# Patient Record
Sex: FEMALE | Race: OTHER | NOT HISPANIC OR LATINO
[De-identification: names, ages, dates, MRNs, and addresses within clinical notes are randomized per-mention and may not be internally consistent; named-entity substitution may affect disease eponyms.]

---

## 2024-10-08 PROBLEM — Z00.00 ENCOUNTER FOR PREVENTIVE HEALTH EXAMINATION: Status: ACTIVE | Noted: 2024-10-08

## 2024-10-09 ENCOUNTER — LABORATORY RESULT (OUTPATIENT)
Age: 44
End: 2024-10-09

## 2024-10-09 ENCOUNTER — APPOINTMENT (OUTPATIENT)
Dept: UROLOGY | Facility: CLINIC | Age: 44
End: 2024-10-09

## 2024-10-09 ENCOUNTER — OUTPATIENT (OUTPATIENT)
Dept: OUTPATIENT SERVICES | Facility: HOSPITAL | Age: 44
LOS: 1 days | End: 2024-10-09
Payer: COMMERCIAL

## 2024-10-09 VITALS
TEMPERATURE: 100.3 F | OXYGEN SATURATION: 97 % | SYSTOLIC BLOOD PRESSURE: 120 MMHG | HEART RATE: 89 BPM | DIASTOLIC BLOOD PRESSURE: 77 MMHG

## 2024-10-09 DIAGNOSIS — Z84.1 FAMILY HISTORY OF DISORDERS OF KIDNEY AND URETER: ICD-10-CM

## 2024-10-09 DIAGNOSIS — Z83.3 FAMILY HISTORY OF DIABETES MELLITUS: ICD-10-CM

## 2024-10-09 DIAGNOSIS — Z87.891 PERSONAL HISTORY OF NICOTINE DEPENDENCE: ICD-10-CM

## 2024-10-09 DIAGNOSIS — N20.0 CALCULUS OF KIDNEY: ICD-10-CM

## 2024-10-09 PROCEDURE — 99203 OFFICE O/P NEW LOW 30 MIN: CPT

## 2024-10-09 PROCEDURE — 74018 RADEX ABDOMEN 1 VIEW: CPT | Mod: 26

## 2024-10-09 PROCEDURE — 74018 RADEX ABDOMEN 1 VIEW: CPT

## 2024-10-09 RX ORDER — MULTIVITAMIN
TABLET ORAL
Refills: 0 | Status: ACTIVE | COMMUNITY

## 2024-10-23 ENCOUNTER — APPOINTMENT (OUTPATIENT)
Dept: UROLOGY | Facility: CLINIC | Age: 44
End: 2024-10-23
Payer: COMMERCIAL

## 2024-10-23 PROCEDURE — 99214 OFFICE O/P EST MOD 30 MIN: CPT

## 2024-11-04 NOTE — ASU PATIENT PROFILE, ADULT - NS PREOP UNDERSTANDS INFO
NPO after midnight except clears such as water or apple juice before 0600 No dairy, gum or candy. We comfortable clothing. No lotions, perfume, contacts or jewelry. Escort is required to bring you home, bring ID and insurance card./yes

## 2024-11-05 ENCOUNTER — TRANSCRIPTION ENCOUNTER (OUTPATIENT)
Age: 44
End: 2024-11-05

## 2024-11-05 ENCOUNTER — OUTPATIENT (OUTPATIENT)
Dept: OUTPATIENT SERVICES | Facility: HOSPITAL | Age: 44
LOS: 1 days | Discharge: HOME | End: 2024-11-05
Payer: COMMERCIAL

## 2024-11-05 ENCOUNTER — APPOINTMENT (OUTPATIENT)
Dept: UROLOGY | Facility: AMBULATORY SURGERY CENTER | Age: 44
End: 2024-11-05

## 2024-11-05 VITALS
HEART RATE: 78 BPM | SYSTOLIC BLOOD PRESSURE: 110 MMHG | WEIGHT: 119.05 LBS | RESPIRATION RATE: 16 BRPM | DIASTOLIC BLOOD PRESSURE: 70 MMHG | HEIGHT: 67 IN | OXYGEN SATURATION: 100 % | TEMPERATURE: 98 F

## 2024-11-05 VITALS
HEART RATE: 70 BPM | OXYGEN SATURATION: 98 % | DIASTOLIC BLOOD PRESSURE: 65 MMHG | SYSTOLIC BLOOD PRESSURE: 111 MMHG | RESPIRATION RATE: 15 BRPM

## 2024-11-05 DIAGNOSIS — Z98.891 HISTORY OF UTERINE SCAR FROM PREVIOUS SURGERY: Chronic | ICD-10-CM

## 2024-11-05 PROCEDURE — 50590 FRAGMENTING OF KIDNEY STONE: CPT | Mod: 52,LT

## 2024-11-05 RX ORDER — ONDANSETRON HYDROCHLORIDE 2 MG/ML
4 INJECTION, SOLUTION INTRAMUSCULAR; INTRAVENOUS ONCE
Refills: 0 | Status: DISCONTINUED | OUTPATIENT
Start: 2024-11-05 | End: 2024-11-05

## 2024-11-05 RX ORDER — OXYCODONE HYDROCHLORIDE 30 MG/1
0 TABLET ORAL
Refills: 0 | DISCHARGE

## 2024-11-05 RX ORDER — APREPITANT 40 MG/1
40 CAPSULE ORAL ONCE
Refills: 0 | Status: COMPLETED | OUTPATIENT
Start: 2024-11-05 | End: 2024-11-05

## 2024-11-05 RX ORDER — ACETAMINOPHEN 500 MG
1000 TABLET ORAL ONCE
Refills: 0 | Status: COMPLETED | OUTPATIENT
Start: 2024-11-05 | End: 2024-11-05

## 2024-11-05 RX ADMIN — Medication 1000 MILLIGRAM(S): at 09:20

## 2024-11-05 RX ADMIN — APREPITANT 40 MILLIGRAM(S): 40 CAPSULE ORAL at 09:20

## 2024-11-05 NOTE — BRIEF OPERATIVE NOTE - OPERATION/FINDINGS
Left ESWL  Attempted left ESWL and stone was noted to be in the dependent bladder therefore we did not proceed with ESWL

## 2024-11-05 NOTE — ASU PREOP CHECKLIST - NS PREOP CHK CHLOROHEX WASH
"CNP Notification    Notified Person: CNP    Notified Person Name: Cady Escobar    N/A

## 2024-11-05 NOTE — BRIEF OPERATIVE NOTE - NSICDXBRIEFPROCEDURE_GEN_ALL_CORE_FT
PROCEDURES:  Extracorporeal shockwave lithotripsy (ESWL) of left ureter 05-Nov-2024 06:56:31  Mirlande Durant   PROCEDURES:  Extracorporeal shockwave lithotripsy (ESWL) of left ureter 05-Nov-2024 06:56:31 Attempted left ESWL Mirlande Durant

## 2024-11-05 NOTE — ASU DISCHARGE PLAN (ADULT/PEDIATRIC) - ASU DC SPECIAL INSTRUCTIONSFT
EXTRACORPOREAL SHOCKWAVE LITHOTRIPSY    GENERAL: It is common to have blood in your urine after your procedure.  It may be pink or even red; and it is important to increase fluid intake to 2-3L of water per day to keep the urine as clear as possible. Please inform your doctor if you have a significant amount of clot in the urine or if you are unable to void at all.  The urine may clear and then become bloody again especially as you are more physically active. It is not uncommon to have some burning when you urinate, this will gradually improve. If a catheter in place, it is not uncommon to have occasional leakage or urine or blood around the catheter. Please call your urologist if this is excessive and/or the urine is not draining through the catheter into the bag.    CATHETER: Some patients are sent home with a Smallwood catheter, which continuously drains the urine from the bladder. If you still have a catheter, the nurses will review instructions and care before you go home. For men, you may have a prescription for lidocaine jelly to apply to the tip of your penis, as needed, for catheter related discomfort.      PAIN: You may take Tylenol (acetaminophen) 650-975mg and/or Motrin (ibuprofen) 400-600mg, both available over the counter, for pain every 6 hours as needed. Do not exceed 4000mg of Tylenol (acetaminophen) daily. You may alternate these medications such that you take one or the other every 3 hours for around the clock pain coverage.      STOOL SOFTENERS: Do not allow yourself to become constipated as straining may cause bleeding. Take stool softeners or a laxative (ex. Miralax, Colace, Senokot, ExLax, etc), available over the counter, if needed.    ANTICOAGULATION: If you are taking any blood thinning medications, please discuss with your urologist prior to restarting these medications unless otherwise specified.    BATHING: You may shower or bathe. If going home with smallwood, shower only until catheter is removed.    DIET: You may resume your regular diet and regular medication regimen.    ACTIVITY: No heavy lifting or strenuous exercise until you are evaluated at your post-operative appointment. Otherwise, you may return to your usual level of physical activity.    FOLLOW-UP: If you did not already schedule your post-operative appointment, please call your urologist to schedule and follow-up appointment.    CALL YOUR UROLOGIST IF: You have any bleeding that does not stop, inability to void >8 hours, fever over 100.4 F, chills, persistent nausea/vomiting, changes in your incision concerning for infection, or if your pain is not controlled on your discharge pain medications. Attempted EXTRACORPOREAL SHOCKWAVE LITHOTRIPSY    GENERAL: It is common to have blood in your urine after your procedure.  It may be pink or even red; and it is important to increase fluid intake to 2-3L of water per day to keep the urine as clear as possible. Please inform your doctor if you have a significant amount of clot in the urine or if you are unable to void at all.  The urine may clear and then become bloody again especially as you are more physically active. It is not uncommon to have some burning when you urinate, this will gradually improve. If a catheter in place, it is not uncommon to have occasional leakage or urine or blood around the catheter. Please call your urologist if this is excessive and/or the urine is not draining through the catheter into the bag.    CATHETER: Some patients are sent home with a Smallwood catheter, which continuously drains the urine from the bladder. If you still have a catheter, the nurses will review instructions and care before you go home. For men, you may have a prescription for lidocaine jelly to apply to the tip of your penis, as needed, for catheter related discomfort.      PAIN: You may take Tylenol (acetaminophen) 650-975mg and/or Motrin (ibuprofen) 400-600mg, both available over the counter, for pain every 6 hours as needed. Do not exceed 4000mg of Tylenol (acetaminophen) daily. You may alternate these medications such that you take one or the other every 3 hours for around the clock pain coverage.      STOOL SOFTENERS: Do not allow yourself to become constipated as straining may cause bleeding. Take stool softeners or a laxative (ex. Miralax, Colace, Senokot, ExLax, etc), available over the counter, if needed.    ANTICOAGULATION: If you are taking any blood thinning medications, please discuss with your urologist prior to restarting these medications unless otherwise specified.    BATHING: You may shower or bathe. If going home with smallwood, shower only until catheter is removed.    DIET: You may resume your regular diet and regular medication regimen.    ACTIVITY: No heavy lifting or strenuous exercise until you are evaluated at your post-operative appointment. Otherwise, you may return to your usual level of physical activity.    FOLLOW-UP: If you did not already schedule your post-operative appointment, please call your urologist to schedule and follow-up appointment.    CALL YOUR UROLOGIST IF: You have any bleeding that does not stop, inability to void >8 hours, fever over 100.4 F, chills, persistent nausea/vomiting, changes in your incision concerning for infection, or if your pain is not controlled on your discharge pain medications.

## 2024-11-05 NOTE — ASU DISCHARGE PLAN (ADULT/PEDIATRIC) - FINANCIAL ASSISTANCE
[facility] provides services at a reduced cost to those who are determined to be eligible through [facility]’s financial assistance program. Information regarding [facility]’s financial assistance program can be found by going to [email or web address] or by calling [phone].

## 2024-11-05 NOTE — PRE-ANESTHESIA EVALUATION ADULT - NSANTHPEFT_GEN_ALL_CORE
Gen: A&Ox4 in NAD with pleasant demeanor  CV: RRR S1/S2 intact  Resp: B/L breath sounds intact, breathing comfortably

## 2024-11-05 NOTE — ASU DISCHARGE PLAN (ADULT/PEDIATRIC) - NS MD DC FALL RISK RISK
For information on Fall & Injury Prevention, visit: [email or web address] OR  [email or web address] OR  https://www.cdc.gov/steadi/patient.html

## 2024-11-06 ENCOUNTER — NON-APPOINTMENT (OUTPATIENT)
Age: 44
End: 2024-11-06

## 2024-12-05 ENCOUNTER — APPOINTMENT (OUTPATIENT)
Dept: UROLOGY | Facility: CLINIC | Age: 44
End: 2024-12-05

## 2024-12-20 ENCOUNTER — APPOINTMENT (OUTPATIENT)
Dept: UROLOGY | Facility: CLINIC | Age: 44
End: 2024-12-20

## 2025-01-03 ENCOUNTER — NON-APPOINTMENT (OUTPATIENT)
Age: 45
End: 2025-01-03

## 2025-02-12 ENCOUNTER — NON-APPOINTMENT (OUTPATIENT)
Age: 45
End: 2025-02-12

## 2025-02-12 ENCOUNTER — APPOINTMENT (OUTPATIENT)
Dept: UROLOGY | Facility: CLINIC | Age: 45
End: 2025-02-12
Payer: COMMERCIAL

## 2025-02-12 PROCEDURE — G2211 COMPLEX E/M VISIT ADD ON: CPT | Mod: NC

## 2025-02-12 PROCEDURE — 76775 US EXAM ABDO BACK WALL LIM: CPT

## 2025-02-12 PROCEDURE — 99213 OFFICE O/P EST LOW 20 MIN: CPT

## 2025-02-25 NOTE — ASU PATIENT PROFILE, ADULT - NS TRANSFER PATIENT BELONGINGS
"Subjective   Patient ID: Guero Cope \"Colby" is a 81 y.o. male who presents for Follow-up.  HPI  Since our last appointment he was admitted at [facility] for hypoglycemia with blood sugars in the 30s and in A-fib with RVR.  He improved with improvement of electrolytes and IV fluids and was medically stable for discharge.    He met with neurology after last appointment.    He has been following with wound care for leg wounds.    Had rectal balloon yesterday for prostate cancer treatment.     Feels jittery today-blood sugar is 177.  Has had bilateral foot tingling intermittently-he is unsure of trigger. It has been present for 6 months. It is only on bottom of feet bilaterally. No back pain a/w tingling. Not better with insulin. No new shoes.     Review of Systems   Constitutional:  Negative for chills, diaphoresis and fever.   Respiratory:  Negative for shortness of breath.    Cardiovascular:  Negative for chest pain.   Gastrointestinal:  Negative for diarrhea, nausea and vomiting.        NO melena, hematochezia, hematemesis, coffee ground emesis.    Neurological:  Negative for dizziness, syncope and light-headedness.       /60   Pulse 84   Temp 36 °C (96.8 °F)   Resp 14   Ht 1.778 m (5' 10\")   SpO2 98%   BMI 22.96 kg/m²     Objective   Physical Exam  Vitals reviewed.   Constitutional:       General: He is not in acute distress.     Appearance: Normal appearance.   HENT:      Head: Normocephalic.   Cardiovascular:      Rate and Rhythm: Normal rate and regular rhythm.   Pulmonary:      Effort: Pulmonary effort is normal. No respiratory distress.      Breath sounds: Normal breath sounds.   Abdominal:      General: There is no distension.   Musculoskeletal:         General: No deformity.   Skin:     Coloration: Skin is not jaundiced.   Neurological:      Mental Status: He is alert.         Assessment/Plan   Problem List Items Addressed This Visit       Anemia    Relevant Orders    CBC    Prostate " cancer (Multi)    Routine health maintenance - Primary    Hepatic steatosis    Relevant Orders    Referral to Hepatology    Primary hypertension    Coronary artery disease involving native coronary artery of native heart without angina pectoris    Stage 3a chronic kidney disease (Multi)    Myasthenia gravis    Type 2 diabetes mellitus with other diabetic neurological complication    Relevant Orders    POCT glycosylated hemoglobin (Hb A1C) manually resulted (Completed)    POCT Fingerstick Glucose manually resulted (Completed)    Myelopathy (Multi)         Jittery  -Check labs  -Close f/u neuro  - today    Fatigue  Thrombocytopenia-resolved  Anemia  - Labs stable, f/u per neuro  -Repeat CBC ordered to monitor anemia     Skin tears  -F/u wound care, better as of recent     Myasthenia gravis  -Multiple admissions at [facility]  -Interval history per Dr. Caicedo with neurology: He was admitted at  from 8/27/23 to 9/9/23 for MG flare, presented with worsening of speech(losing his voice). He tried IVIG (2 doses) without benefit, had worsening of secretions and hypoxia needing intubation(x5 days). Had 5 cycles of PLEX (8/31 to 9/11/23) which helped, started on mycophenolate (500 mg bid).   -S/p removal of right sided TDC  -  Per neuro weaning prednisone on CellCept 1 g twice daily.  Off vygart.  -Continue f/u neurology  -Recommend he discuss tingling in feet with neurology or if back pain reach out to nsgy. ER precautions     Primary hypertension  -On losartan 25 mg a day, amlodipine 10 mg a day, follow-up with cardiology.  -Controlled today     Coronary artery disease  PAD  Hyperlipidemia  HX atrial fibrillation  -Continue management per cardiology including rosuvastatin 20 mg a day, aspirin 81 mg a day.   -Management per Dr. Roque     Kidney stones  -On allopurinol, may need dose adjustment in light of kidney function, defer to urology and will see next week.   -Normal uric acid 3/24  -F/u urology     Renal cyst  -Seen on  imaging in hospitalization, advise f/u with urology     Prostate cancer  - Continue follow-up with Dr. Fay.  Flomax, Myrbetriq, and further management per urology.  -More tx per urology.      Stage IIIa chronic kidney disease  - CKD noted in EMR  -See above elevated creatinine  -U/s kidneys reviewed previously  -Follow-up per nephrology. Advised to make new appt.      Hyperglycemia  -F/u endocrinology, Dr. Greene.  - On insulin glargine, Humalog with sliding scale, metformin  -A1c 5.7->5.8  -BS well controlled, has dexcom.   -Urine albumin normal 8/24.   -F/u and management per endocrinology  -He gave too much insulin on mistake which prompted ER evaluation.      Insomnia  -Sleep is better as of recent.      Cuadra's esophagus  Hiatal hernia  -on dexilant  -Hiatal hernia seen on CT chest 7/23  -Reflux controlled.      Mild hepatic steatosis seen on U/s abdomen and spleen. Referred to hepatology again.      Lower extremity edema-resolved  -Connected with his cardiologist Dr. Roque  -Was diuresed and repeat echo showed EF 55%, repeat in 1-2 years. No longer on lasix.      Status post lumbar fusion  Myelopathy  -Continue follow-up per Naz Murphy and Dr. Main, plan per them.    -Saw 1/22/24 and will see 5 months with xrays.   -Intermittent low back pain still from time to time but not major issue. Will let specialists know if sx return.   -ER for saddle paresthesia, weakness, urinary or fecal incontinence.      Health Maintenance  -Prostate Cancer Screening: Per urology  -Vaccinations: Reports UTD pneumonia vaccine in last 2 years, flu vaccine, shingles, covid vaccination and booster and reports utd pneumonia, UTD TDAP, due 2026  -Lung Cancer Screening: Not indicated due to age  -AAA Screening: Not indicated due to age  -Colonoscopy: Due 2025, was told no longer needs by Dr. Kimble personally per patient.      Follow-up 3 months MWV, sooner PRN   Clothing

## 2025-09-18 ENCOUNTER — APPOINTMENT (OUTPATIENT)
Dept: UROLOGY | Facility: CLINIC | Age: 45
End: 2025-09-18
Payer: COMMERCIAL

## 2025-09-18 DIAGNOSIS — N20.0 CALCULUS OF KIDNEY: ICD-10-CM

## 2025-09-18 DIAGNOSIS — R82.991 HYPOCITRATURIA: ICD-10-CM

## 2025-09-18 PROCEDURE — 99214 OFFICE O/P EST MOD 30 MIN: CPT

## 2025-09-18 PROCEDURE — G2211 COMPLEX E/M VISIT ADD ON: CPT | Mod: NC

## 2025-09-18 RX ORDER — POTASSIUM CITRATE 15 MEQ/1
15 MEQ TABLET, EXTENDED RELEASE ORAL
Qty: 60 | Refills: 11 | Status: ACTIVE | COMMUNITY
Start: 2025-09-18 | End: 1900-01-01

## (undated) DEVICE — VENODYNE/SCD SLEEVE CALF MEDIUM

## (undated) DEVICE — POSITIONER FOAM EGG CRATE ULNAR 2PCS (PINK)

## (undated) DEVICE — WARMING BLANKET UPPER ADULT